# Patient Record
Sex: FEMALE | URBAN - METROPOLITAN AREA
[De-identification: names, ages, dates, MRNs, and addresses within clinical notes are randomized per-mention and may not be internally consistent; named-entity substitution may affect disease eponyms.]

---

## 2023-03-27 ENCOUNTER — Encounter (OUTPATIENT)
Facility: LOCATION | Age: 28
End: 2023-03-27

## 2023-06-20 ENCOUNTER — REFRACTIVE (OUTPATIENT)
Facility: LOCATION | Age: 28
End: 2023-06-20

## 2023-06-20 DIAGNOSIS — H17.9 UNSPECIFIED CORNEAL SCAR AND OPACITY: ICD-10-CM

## 2023-06-20 DIAGNOSIS — H52.13 MYOPIA, BILATERAL: Primary | ICD-10-CM

## 2023-06-20 RX ORDER — PREDNISOLONE ACETATE 10 MG/ML
1 % SUSPENSION/ DROPS OPHTHALMIC
Qty: 3 | Refills: 4 | Status: ACTIVE
Start: 2023-06-20

## 2023-06-20 RX ORDER — MOXIFLOXACIN 5 MG/ML
0.5 % SOLUTION/ DROPS OPHTHALMIC
Qty: 3 | Refills: 4 | Status: ACTIVE
Start: 2023-06-20

## 2023-06-20 ASSESSMENT — KERATOMETRY
OS: 42.05
OD: 41.38

## 2023-06-20 ASSESSMENT — VISUAL ACUITY
OD: 20/20
OS: 20/25

## 2023-06-23 ENCOUNTER — POST-OPERATIVE VISIT (OUTPATIENT)
Facility: LOCATION | Age: 28
End: 2023-06-23

## 2023-06-23 DIAGNOSIS — Z48.810 ENCOUNTER FOR SURGICAL AFTERCARE FOLLOWING SURGERY ON A SENSE ORGAN: Primary | ICD-10-CM

## 2023-06-23 PROCEDURE — 99024 POSTOP FOLLOW-UP VISIT: CPT

## 2023-06-23 NOTE — IMPRESSION/PLAN
Impression: S/P [employer] [facility] OD, PRK [address] OS OU - 1 Day. Encounter for surgical aftercare following surgery on a sense organ  Z48.810. Plan: Continue present medications / non-preserved artificial tears. RTC 1 week - Check VA (OD, OS, OU) and exam. If Epithelial Defect healed d/c bscl. Once doing well pt to see Dr. Erika Mora soon after for continued post-operative care. The patient was educated on what to expect the first several days following surgery. As the epithelial defect heals, it is common for visual acuity to decrease, as is increased tearing, increased photophobia and increased discomfort. To help with pain management it is advised to place all medicated drops and artificial tears in the refrigerator, use a topical NSAID as instructed, the patient may also use oral ibuprofen and stay in a dark room and place a bag of frozen peas or corn on the eyes. RTC PRN decrease VA, increase pain, redness, discharge, photophobia, flashes/floaters or other vision problems.

## 2023-06-27 ENCOUNTER — POST-OPERATIVE VISIT (OUTPATIENT)
Facility: LOCATION | Age: 28
End: 2023-06-27

## 2023-06-27 DIAGNOSIS — Z48.810 ENCOUNTER FOR SURGICAL AFTERCARE FOLLOWING SURGERY ON A SENSE ORGAN: Primary | ICD-10-CM

## 2023-06-27 PROCEDURE — 99024 POSTOP FOLLOW-UP VISIT: CPT | Performed by: OPTOMETRIST

## 2023-06-27 RX ORDER — LOTEPREDNOL ETABONATE 5 MG/ML
0.5 % SUSPENSION/ DROPS OPHTHALMIC
Qty: 4 | Refills: 4 | Status: ACTIVE
Start: 2023-06-27

## 2023-06-27 NOTE — IMPRESSION/PLAN
Impression: S/P [employer] [facility] OD, PRK [address] OS OU - 5 Days. Encounter for surgical aftercare following surgery on a sense organ  Z48.810. 
1 week s/p PRK OS doing well. Epithelial Defect healed (+) Irregular central epithelial cells as expected with PRK healing. BSCL with good m&c present. Plan: discontinued bscl. d/c antibiotic (Moxifloxacin  TID) 2-3 days. Continue soft steroid taper (Lotemax) (1gt QID x 1 month, 1 gt TID x 1 month, 1 gt BID x 1 month, 1 gt QD x 1 month then d/c). Continue non-preserved artificial tears 1gt q2hrs x 1 week, then switch to bottled artificial tears QID x 2 months, then BID x 2 months. Continue 500 mg of Vitamin C BID PO x 6 months. Patient to wear 100% UV A and B sunglasses while outdoors x 6 months post op. RTC 1 month, check VA (OD, OS, OU), Ks, prism balanced MR, IOP and exam.
Once doing well pt to see Dr. Mary Kate Underwood soon after for continued post-operative care. RTC PRN decrease VA, increase pain, redness, discharge, photophobia, flashes/floaters or other vision problems.

## 2023-06-27 NOTE — IMPRESSION/PLAN
Impression: S/P [employer] Las[facility] OD, PRK [facility] OS - 5 Days. Encounter for surgical aftercare following surgery on a sense organ  Z48.810. 
1 week s/p LASIK OS doing well Plan: d/c antibiotic Moxifloxacin and steroid Prednisolone 2-3 days. Continue non-preserved artificial tears q 2 hrs x 1 week. Then switch to bottled tears qid x 2 months then bid x 2 months. Patient to see Dr. Edgard Solis soon after next visit for continued post-operative care and annual eye health and vision examination. Patient to RTC 1 month. Check VA (OD,OS,OU), Ks, prism balance MR, and exam.
RTC PRN decrease VA, increase pain, redness, discharge, photophobia, flashes/floaters or other vision problems.

## 2023-08-01 ENCOUNTER — POST-OPERATIVE VISIT (OUTPATIENT)
Facility: LOCATION | Age: 28
End: 2023-08-01

## 2023-08-01 DIAGNOSIS — Z48.810 ENCOUNTER FOR SURGICAL AFTERCARE FOLLOWING SURGERY ON A SENSE ORGAN: Primary | ICD-10-CM

## 2023-08-01 PROCEDURE — 99024 POSTOP FOLLOW-UP VISIT: CPT | Performed by: OPTOMETRIST

## 2023-08-01 ASSESSMENT — KERATOMETRY
OS: 40.13
OD: 41.13

## 2023-08-01 ASSESSMENT — INTRAOCULAR PRESSURE
OD: 18
OD: 16
OS: 28
OS: 27

## 2023-08-01 ASSESSMENT — VISUAL ACUITY
OD: 20/15
OS: 20/30

## 2023-08-08 ENCOUNTER — POST-OPERATIVE VISIT (OUTPATIENT)
Facility: LOCATION | Age: 28
End: 2023-08-08

## 2023-08-08 DIAGNOSIS — Z48.810 ENCOUNTER FOR SURGICAL AFTERCARE FOLLOWING SURGERY ON A SENSE ORGAN: Primary | ICD-10-CM

## 2023-08-08 PROCEDURE — 99024 POSTOP FOLLOW-UP VISIT: CPT | Performed by: OPTOMETRIST

## 2023-08-08 RX ORDER — BIMATOPROST 0.1 MG/ML
0.01 % SOLUTION/ DROPS OPHTHALMIC
Qty: 0 | Refills: 0 | Status: ACTIVE
Start: 2023-08-08

## 2023-08-08 RX ORDER — LOTEPREDNOL ETABONATE 5 MG/ML
0.5 % SUSPENSION/ DROPS OPHTHALMIC
Qty: 4 | Refills: 4 | Status: ACTIVE
Start: 2023-08-08

## 2023-08-08 ASSESSMENT — INTRAOCULAR PRESSURE: OS: 22

## 2023-09-26 ENCOUNTER — POST-OPERATIVE VISIT (OUTPATIENT)
Facility: LOCATION | Age: 28
End: 2023-09-26

## 2023-09-26 DIAGNOSIS — Z48.810 ENCOUNTER FOR SURGICAL AFTERCARE FOLLOWING SURGERY ON A SENSE ORGAN: Primary | ICD-10-CM

## 2023-09-26 PROCEDURE — 99024 POSTOP FOLLOW-UP VISIT: CPT | Performed by: OPTOMETRIST

## 2023-09-26 ASSESSMENT — INTRAOCULAR PRESSURE
OD: 16
OS: 16

## 2023-09-26 ASSESSMENT — VISUAL ACUITY
OS: 20/40
OD: 20/20

## 2023-09-26 ASSESSMENT — KERATOMETRY
OD: 39.63
OS: 40.63

## 2023-11-14 PROCEDURE — 99024 POSTOP FOLLOW-UP VISIT: CPT | Performed by: OPTOMETRIST
